# Patient Record
Sex: FEMALE | Race: WHITE | Employment: PART TIME | URBAN - METROPOLITAN AREA
[De-identification: names, ages, dates, MRNs, and addresses within clinical notes are randomized per-mention and may not be internally consistent; named-entity substitution may affect disease eponyms.]

---

## 2017-03-29 RX ORDER — METOPROLOL TARTRATE 50 MG/1
50 TABLET ORAL 2 TIMES DAILY
Qty: 180 TAB | Refills: 2 | Status: SHIPPED | OUTPATIENT
Start: 2017-03-29

## 2017-04-10 ENCOUNTER — OFFICE VISIT (OUTPATIENT)
Dept: INTERNAL MEDICINE CLINIC | Age: 72
End: 2017-04-10

## 2017-04-10 VITALS
HEIGHT: 61 IN | RESPIRATION RATE: 18 BRPM | SYSTOLIC BLOOD PRESSURE: 160 MMHG | BODY MASS INDEX: 27 KG/M2 | TEMPERATURE: 97.9 F | HEART RATE: 62 BPM | WEIGHT: 143 LBS | DIASTOLIC BLOOD PRESSURE: 90 MMHG | OXYGEN SATURATION: 98 %

## 2017-04-10 DIAGNOSIS — Z01.818 PRE-OP EVALUATION: Primary | ICD-10-CM

## 2017-04-10 DIAGNOSIS — M17.12 PRIMARY OSTEOARTHRITIS OF LEFT KNEE: ICD-10-CM

## 2017-04-10 DIAGNOSIS — R73.02 GLUCOSE INTOLERANCE (IMPAIRED GLUCOSE TOLERANCE): ICD-10-CM

## 2017-04-10 RX ORDER — DICLOFENAC SODIUM 10 MG/G
GEL TOPICAL
COMMUNITY
Start: 2015-12-14

## 2017-04-10 NOTE — MR AVS SNAPSHOT
Visit Information Date & Time Provider Department Dept. Phone Encounter #  
 4/10/2017  2:00 PM [facility] [phone] Upcoming Health Maintenance Date Due Hepatitis C Screening 1945 DTaP/Tdap/Td series (1 - Tdap) 9/6/1966 GLAUCOMA SCREENING Q2Y 9/6/2010 MEDICARE YEARLY EXAM 11/5/2014 COLONOSCOPY 1/1/2017 BREAST CANCER SCRN MAMMOGRAM 3/3/2017 Allergies as of 4/10/2017  Review Complete On: 4/10/2017 By: Kelvin Aguilar NP Severity Noted Reaction Type Reactions Ace Inhibitors  01/08/2011    Cough Lactase    Diarrhea Nsaids (Non-steroidal Anti-inflammatory Drug)  01/08/2011    Other (comments) Hypertensive Plendil [Felodipine]  07/03/2011    Other (comments)  
 headache Current Immunizations  Reviewed on 7/19/2016 Name Date Influenza Vaccine 11/10/2016, 10/1/2015, 10/1/2014, 10/15/2013 Pneumococcal Conjugate (PCV-13) 7/5/2016 Pneumococcal Polysaccharide (PPSV-23) 1/1/2011 Pneumococcal Vaccine (Unspecified Type) 9/10/2009 Zoster Vaccine, Live 10/1/2016 Not reviewed this visit You Were Diagnosed With   
  
 Codes Comments Pre-op evaluation    -  Primary ICD-10-CM: B22.831 ICD-9-CM: V72.84 Primary osteoarthritis of left knee     ICD-10-CM: M17.12 
ICD-9-CM: 715.16 Glucose intolerance (impaired glucose tolerance)     ICD-10-CM: R73.02 
ICD-9-CM: 790.22 Vitals BP Pulse Temp Resp Height(growth percentile) Weight(growth percentile) 160/90 (BP 1 Location: Left arm, BP Patient Position: Sitting) 62 97.9 °F (36.6 °C) (Oral) 18 5' 1\" (1.549 m) 143 lb (64.9 kg) SpO2 BMI OB Status Smoking Status 98% 27.02 kg/m2 Postmenopausal Former Smoker Vitals History BMI and BSA Data Body Mass Index Body Surface Area  
 27.02 kg/m 2 1.67 m 2 Preferred Pharmacy Pharmacy Name Phone [phone] [facility] [phone] Your Updated Medication List  
  
   
This list is accurate as of: 4/10/17  3:04 PM.  Always use your most recent med list.  
  
  
  
  
 aspirin 325 mg tablet Commonly known as:  ASPIRIN Take 650 mg by mouth daily. butalbital-aspirin-caffeine -40 mg tablet Commonly known as:  Gayleen Sparrow Take 1 Tab by mouth every six (6) hours as needed for Headache. Max Daily Amount: 4 Tabs. Indications: MIGRAINE  
  
 diphenoxylate-atropine 2.5-0.025 mg per tablet Commonly known as:  LOMOTIL Take 1 Tab by mouth four (4) times daily as needed for Diarrhea. Max Daily Amount: 4 Tabs.  
  
 gabapentin 100 mg capsule Commonly known as:  NEURONTIN Take 2 capsules three times daily. IMODIUM A-D PO Take  by mouth.  
  
 latanoprost 0.005 % ophthalmic solution Commonly known as:  Margarita Loose Administer 1 Drop to both eyes nightly. losartan 100 mg tablet Commonly known as:  COZAAR  
TAKE ONE TABLET BY MOUTH ONCE DAILY  
  
 metoprolol tartrate 50 mg tablet Commonly known as:  LOPRESSOR Take 1 Tab by mouth two (2) times a day. pravastatin 40 mg tablet Commonly known as:  PRAVACHOL Take 1 Tab by mouth nightly. PROBIOTIC PO Take  by mouth. turmeric root extract 500 mg Cap Take 500 mg by mouth three (3) times daily. TYLENOL 325 mg tablet Generic drug:  acetaminophen Take 1,000 mg by mouth three (3) times daily. VITAMIN D3 2,000 unit Tab Generic drug:  cholecalciferol (vitamin D3) Take  by mouth. VOLTAREN 1 % Gel Generic drug:  diclofenac Apply  to affected area. We Performed the Following ALBUMIN [id number] CPT(R)] CBC WITH AUTOMATED DIFF [46045 CPT(R)] HEMOGLOBIN A1C WITH EAG [81665 CPT(R)] METABOLIC PANEL, COMPREHENSIVE [39876 CPT(R)] TRANSFERRIN [id number] CPT(R)] TYPE & SCREEN [[id number] Custom] Comments: ENTER SURGERY DATE IF FOR PRE-OP TESTING. Introducing [facility]! Vikki Barakat introduces [employer] patient portal. Now you can access parts of your medical record, email your doctor's office, and request medication refills online. 1. In your internet browser, go to [email or web address] 2. Click on the First Time User? Click Here link in the Sign In box. You will see the New Member Sign Up page. 3. Enter your [employer] Access Code exactly as it appears below. You will not need to use this code after youve completed the sign-up process. If you do not sign up before the expiration date, you must request a new code. · [employer] Access Code: [email or web address] Expires: 7/9/2017  3:04 PM 
 
4. Enter the last four digits of your Social Security Number (xxxx) and Date of Birth (mm/dd/yyyy) as indicated and click Submit. You will be taken to the next sign-up page. 5. Create a [employer] ID. This will be your [employer] login ID and cannot be changed, so think of one that is secure and easy to remember. 6. Create a [employer] password. You can change your password at any time. 7. Enter your Password Reset Question and Answer. This can be used at a later time if you forget your password. 8. Enter your e-mail address. You will receive e-mail notification when new information is available in [address]. 9. Click Sign Up. You can now view and download portions of your medical record. 10. Click the Download Summary menu link to download a portable copy of your medical information. If you have questions, please visit the Frequently Asked Questions section of the [employer] website. Remember, [employer] is NOT to be used for urgent needs. For medical emergencies, dial 911. Now available from your iPhone and Android! Please provide this summary of care documentation to your next provider. Your primary care clinician is listed as [address].  If you have any questions after today's visit, please call [phone].

## 2017-04-10 NOTE — PROGRESS NOTES
71 yo female in for pre op eval for LTKR on 5/3/17 at [facility] by Dr. Leslie Frye. See scanned in form. Labs to be drawn at [facility] (patient desired) as ordered by me per instruction of [address]. She plans to do this in the next day or so.

## 2017-04-26 DIAGNOSIS — E78.1 HYPERTRIGLYCERIDEMIA: ICD-10-CM

## 2017-04-26 RX ORDER — PRAVASTATIN SODIUM 40 MG/1
TABLET ORAL
Qty: 90 TAB | Refills: 2 | Status: SHIPPED | OUTPATIENT
Start: 2017-04-26

## 2017-06-22 NOTE — TELEPHONE ENCOUNTER
Last office visit 4/10/17  Follow up instructions are unclear. Attempted to contact patient to schedule follow up without success.  Left voicemail message requesting a call back to the office

## 2017-06-23 RX ORDER — LOSARTAN POTASSIUM 100 MG/1
TABLET ORAL
Qty: 90 TAB | Refills: 0 | OUTPATIENT
Start: 2017-06-23

## 2017-06-23 NOTE — TELEPHONE ENCOUNTER
She is recovering from her TKR at home. She reports she had chest pain after the surgery and EKG changes followed by an ETT. She is having a cardiac cath next week. She needs a refill on her Losartan and Neurontin. She plans to establish PCP care at [facility] since all her other providers are now in that system. She will see that new provider on Sept 7 with Dr. Mateusz Das. I will refill her Gabapentin to last till that appointment.

## 2017-06-23 NOTE — TELEPHONE ENCOUNTER
----- Message from Rebecca Hunt sent at 6/23/2017 10:43 AM EDT -----  Regarding: /telephone  Pt requesting to speak with the provider regarding her refills on her medications. Best contact number is [phone].